# Patient Record
Sex: MALE | Race: WHITE | NOT HISPANIC OR LATINO | URBAN - METROPOLITAN AREA
[De-identification: names, ages, dates, MRNs, and addresses within clinical notes are randomized per-mention and may not be internally consistent; named-entity substitution may affect disease eponyms.]

---

## 2023-10-29 ENCOUNTER — OFFICE VISIT (OUTPATIENT)
Dept: URGENT CARE | Facility: CLINIC | Age: 63
End: 2023-10-29
Payer: COMMERCIAL

## 2023-10-29 VITALS
WEIGHT: 170 LBS | RESPIRATION RATE: 16 BRPM | OXYGEN SATURATION: 96 % | TEMPERATURE: 97.7 F | SYSTOLIC BLOOD PRESSURE: 155 MMHG | DIASTOLIC BLOOD PRESSURE: 92 MMHG | BODY MASS INDEX: 26.65 KG/M2 | HEART RATE: 84 BPM

## 2023-10-29 DIAGNOSIS — J01.90 ACUTE SINUSITIS, RECURRENCE NOT SPECIFIED, UNSPECIFIED LOCATION: Primary | ICD-10-CM

## 2023-10-29 DIAGNOSIS — H10.33 ACUTE CONJUNCTIVITIS OF BOTH EYES, UNSPECIFIED ACUTE CONJUNCTIVITIS TYPE: ICD-10-CM

## 2023-10-29 PROCEDURE — 1036F TOBACCO NON-USER: CPT | Performed by: PHYSICIAN ASSISTANT

## 2023-10-29 PROCEDURE — 99203 OFFICE O/P NEW LOW 30 MIN: CPT | Performed by: PHYSICIAN ASSISTANT

## 2023-10-29 RX ORDER — OFLOXACIN 3 MG/ML
1 SOLUTION/ DROPS OPHTHALMIC 4 TIMES DAILY
Qty: 5 ML | Refills: 0 | Status: SHIPPED | OUTPATIENT
Start: 2023-10-29 | End: 2023-11-03

## 2023-10-29 RX ORDER — PREDNISOLONE ACETATE 10 MG/ML
SUSPENSION/ DROPS OPHTHALMIC
COMMUNITY
Start: 2021-11-09

## 2023-10-29 RX ORDER — BENZONATATE 100 MG/1
100 CAPSULE ORAL 3 TIMES DAILY PRN
Qty: 30 CAPSULE | Refills: 0 | Status: SHIPPED | OUTPATIENT
Start: 2023-10-29 | End: 2024-10-28

## 2023-10-29 RX ORDER — BIMATOPROST 0.3 MG/ML
1 SOLUTION/ DROPS OPHTHALMIC
COMMUNITY

## 2023-10-29 RX ORDER — NETARSUDIL 0.2 MG/ML
SOLUTION/ DROPS OPHTHALMIC; TOPICAL
COMMUNITY

## 2023-10-29 RX ORDER — TIMOLOL MALEATE 5 MG/ML
1 SOLUTION/ DROPS OPHTHALMIC 2 TIMES DAILY
COMMUNITY
Start: 2021-05-04

## 2023-10-29 RX ORDER — DOXYCYCLINE 100 MG/1
100 CAPSULE ORAL 2 TIMES DAILY
Qty: 20 CAPSULE | Refills: 0 | Status: SHIPPED | OUTPATIENT
Start: 2023-10-29 | End: 2023-11-08

## 2023-10-29 ASSESSMENT — ENCOUNTER SYMPTOMS
EYE ITCHING: 1
SINUS PRESSURE: 1
SINUS COMPLAINT: 1
COUGH: 1
SORE THROAT: 1
EYE REDNESS: 1
EYE DISCHARGE: 1

## 2023-10-29 ASSESSMENT — PAIN SCALES - GENERAL: PAINLEVEL: 0-NO PAIN

## 2023-10-29 NOTE — PROGRESS NOTES
Subjective   Patient ID: Celso Malave is a 63 y.o. male.    Patient is a 63-year-old male who arrives for evaluation of 2 separate complaints.  Patient reports that he has been experiencing worsening congestion, sinus pressure, sore throat and productive cough for the past 1 week.  Patient also reports increasing redness and irritation to his bilateral eyes.  Patient has a significant history of glaucoma and is followed closely by his ophthalmologist.  Patient denies accident or foreign body to his bilateral eyes and does not wear contact lenses.  Patient denies acute changes to his vision.  Patient states that his eyes are chronically intensely red but that his presentation today is notably worse than his typical baseline.  Patient denies eye pain.  Patient also noted episodes of matting and discharge to his bilateral eyes right greater than left.      Cough  Associated symptoms include eye redness, postnasal drip and a sore throat.   Sinus Problem  Associated symptoms: congestion, cough and sore throat      The following portions of the chart were reviewed this encounter and updated as appropriate:       Review of Systems   HENT:  Positive for congestion, postnasal drip, sinus pressure and sore throat.    Eyes:  Positive for discharge, redness and itching.   Respiratory:  Positive for cough.    All other systems reviewed and are negative.    Objective   Physical Exam  Vitals and nursing note reviewed.   Constitutional:       Appearance: Normal appearance. He is normal weight.   HENT:      Head: Normocephalic and atraumatic.      Right Ear: Tympanic membrane, ear canal and external ear normal.      Left Ear: Tympanic membrane, ear canal and external ear normal.      Nose: Nose normal. No congestion or rhinorrhea.      Mouth/Throat:      Mouth: Mucous membranes are moist.      Pharynx: Oropharynx is clear. No oropharyngeal exudate or posterior oropharyngeal erythema.   Eyes:      Extraocular Movements:  Extraocular movements intact.      Pupils: Pupils are equal, round, and reactive to light.      Comments: Intense injection and erythema is noted to the bilateral eyes right greater than left.  No matting or discharge is noted on exam.  Bilateral superior and inferior eyelids are clear without erythema or edema.  No hordeolum, pustule, vesicle or other lid lesions are noted.  Bilateral periorbital skin is clear without erythema or edema.  Pupils are equal, round and reactive to light and accommodation the patient demonstrates full extraocular range of motion bilaterally.   Cardiovascular:      Rate and Rhythm: Normal rate and regular rhythm.      Pulses: Normal pulses.      Heart sounds: Normal heart sounds.   Pulmonary:      Effort: Pulmonary effort is normal. No respiratory distress.      Breath sounds: Normal breath sounds. No stridor. No wheezing, rhonchi or rales.   Musculoskeletal:      Cervical back: Normal range of motion and neck supple.   Skin:     General: Skin is warm and dry.      Capillary Refill: Capillary refill takes less than 2 seconds.   Neurological:      General: No focal deficit present.      Mental Status: He is alert and oriented to person, place, and time.   Psychiatric:         Mood and Affect: Mood normal.         Behavior: Behavior normal.         Thought Content: Thought content normal.         Judgment: Judgment normal.     Assessment/Plan   Physical exam findings as noted above.  Patient was provided with prescriptions for doxycycline 100 mg and Tessalon 100 mg for his acute sinusitis.  Patient was provided with a prescription for ofloxacin 0.3% ophthalmic solution and he was instructed to contact his ophthalmologist's office tomorrow to inform staff of same.  Patient was advised that his ophthalmologist may instruct him to continue the Floxin or may prefer a different agent due to the patient's significant glaucoma issues and concomitant ophthalmic medications.  Patient verbalizes  clear understanding of the above instructions.    CLINICAL IMPRESSION:  Acute Sinusitis;  Acute Conjunctivitis    Diagnoses and all orders for this visit:  Acute sinusitis, recurrence not specified, unspecified location  -     doxycycline (Monodox) 100 mg capsule; Take 1 capsule (100 mg) by mouth 2 times a day for 10 days. Take with at least 8 ounces (large glass) of water, do not lie down for 30 minutes after  -     benzonatate (Tessalon Perles) 100 mg capsule; Take 1 capsule (100 mg) by mouth 3 times a day as needed for cough.  Acute conjunctivitis of both eyes, unspecified acute conjunctivitis type  -     ofloxacin (Ocuflox) 0.3 % ophthalmic solution; Administer 1 drop into both eyes 4 times a day for 5 days.